# Patient Record
Sex: FEMALE | Race: WHITE | NOT HISPANIC OR LATINO | Employment: FULL TIME | URBAN - NONMETROPOLITAN AREA
[De-identification: names, ages, dates, MRNs, and addresses within clinical notes are randomized per-mention and may not be internally consistent; named-entity substitution may affect disease eponyms.]

---

## 2022-10-12 ENCOUNTER — OFFICE VISIT (OUTPATIENT)
Dept: SURGERY | Facility: CLINIC | Age: 62
End: 2022-10-12

## 2022-10-12 VITALS
WEIGHT: 112 LBS | DIASTOLIC BLOOD PRESSURE: 74 MMHG | SYSTOLIC BLOOD PRESSURE: 162 MMHG | BODY MASS INDEX: 21.99 KG/M2 | HEIGHT: 60 IN | HEART RATE: 77 BPM

## 2022-10-12 DIAGNOSIS — Z12.31 SCREENING MAMMOGRAM FOR HIGH-RISK PATIENT: ICD-10-CM

## 2022-10-12 DIAGNOSIS — R92.2 DENSE BREAST TISSUE ON MAMMOGRAM: ICD-10-CM

## 2022-10-12 DIAGNOSIS — N63.21 MASS OF UPPER OUTER QUADRANT OF LEFT BREAST: Primary | ICD-10-CM

## 2022-10-12 PROCEDURE — 99203 OFFICE O/P NEW LOW 30 MIN: CPT | Performed by: SPECIALIST

## 2022-10-12 RX ORDER — AMLODIPINE BESYLATE 5 MG/1
5 TABLET ORAL
COMMUNITY

## 2022-10-12 RX ORDER — ATENOLOL 25 MG/1
25 TABLET ORAL
COMMUNITY

## 2022-10-12 NOTE — PROGRESS NOTES
"Polina Ji MD [facility] History and Physical     Referring Provider: Anoop Matias A*      Chief complaint   Chief Complaint   Patient presents with   • Breast Mass     F/u after mammogram and U/S.        Subjective .     History of present illness:  Nydia Mcgrath is a 62 y.o. female who presents after recent bilateral screening mammogram demonstrated a 7mm rounded partially circumscribed mass at 3-4 o'clock.  Left ultrasound demonstrated a 1cm slightly lobulated solid nodule at 3 o'clock with a central echogenic portion which was thought to be an intramammary lymph node  She denies any breast, nipple, or skin changes.    History    Past Medical History:   Diagnosis Date   • Hypertension    ,   Past Surgical History:   Procedure Laterality Date   • HYSTERECTOMY     , History reviewed. No pertinent family history.,   Social History     Tobacco Use   • Smoking status: Never   • Smokeless tobacco: Never   Vaping Use   • Vaping Use: Never used   Substance Use Topics   • Alcohol use: Never   • Drug use: Never   , (Not in a hospital admission)   and Allergies:  Codeine, Food color red, Red dye, and Fexofenadine    Current Outpatient Medications:   •  amLODIPine (NORVASC) 5 MG tablet, 1 tablet., Disp: , Rfl:   •  atenolol (TENORMIN) 25 MG tablet, 1 tablet., Disp: , Rfl:     GYN History:  Age of first menses    12    Age of first live birth    16    Age of menopause    38 hysterectomy    Hormone replacement therapy  no    Family history breast cancer   no    Family history ovarian cancer   no    History breast biopsy    no        Review of Systems:    All organ systems were evaluated and found negative except those which are mentioned in the History of Present Illness.      Objective     Physical Exam:    Vital Signs   /74 (BP Location: Right arm, Patient Position: Sitting, Cuff Size: Adult)   Pulse 77   Ht 152.4 cm (60\")   Wt 50.8 kg (112 lb)   BMI 21.87 kg/m²        Constitutional:    Well-developed, " well-nourished in no acute distress  Eyes:     Extraocular movements intact; pupils equal, round, and reactive  Ears, Nose, Mouth, Throat:  Hearing intact, nose midline, no mucosal lesions  Cardiovascular:   Regular rate and rhythm   Respiratory:    Clear to auscultation bilaterally  Gastrointestinal:   Soft, nontender, nondistended, no masses, bowel sounds intact  Genitourinary:    Deferred  Musculoskeletal:   Full range of motion, no muscle wasting, no weakness  Skin:     No rashes or excoriations  Neurological:    Moves all extremities, sensation intact  Psychiatric:    Alert and oriented to person, place, and time  Hematologic/Lymphatic/Immune: No lymphadenopathy  Breast     Nipples everted without expressible discharge.  No erythema, edema, induration, or dimpling.   No palpable mass.  No axillary adenopathy.      Imaging:   [facility] screening mammogram, L us 09/01/22:  1cm solid lobulated nodule 3 o'clock probably intramammary lymph node.    BMI is within normal parameters. No other follow-up for BMI required.    Assessment & Plan       Diagnoses and all orders for this visit:    1. Mass of upper outer quadrant of left breast (Primary)  -     Mammo Diagnostic Left With CAD; Future  -     US Breast Left Complete; Future    2. Screening mammogram for high-risk patient  -     Mammo Diagnostic Left With CAD; Future  -     US Breast Left Complete; Future    3. Dense breast tissue on mammogram  -     Mammo Diagnostic Left With CAD; Future  -     US Breast Left Complete; Future           A comprehensive discussion of the breast including her clinical findings and imaging was undertaken.  She will be scheduled for left diagnostic mammogram and ultrasound in six months followed by clinical examination.  She will return sooner with breast, nipple, or skin changes.      An extensive review of patient intake forms, referring physician documents, laboratories, and imaging was performed in the medical decision making and  surgical planning of this patient.          Polina Ji MD

## 2023-02-20 ENCOUNTER — APPOINTMENT (OUTPATIENT)
Dept: OTHER | Facility: HOSPITAL | Age: 63
End: 2023-02-20
Payer: COMMERCIAL

## 2023-02-20 ENCOUNTER — APPOINTMENT (OUTPATIENT)
Dept: ULTRASOUND IMAGING | Facility: HOSPITAL | Age: 63
End: 2023-02-20

## 2023-02-20 ENCOUNTER — APPOINTMENT (OUTPATIENT)
Dept: MAMMOGRAPHY | Facility: HOSPITAL | Age: 63
End: 2023-02-20

## 2023-02-20 ENCOUNTER — HOSPITAL ENCOUNTER (OUTPATIENT)
Dept: MAMMOGRAPHY | Facility: HOSPITAL | Age: 63
Discharge: HOME | End: 2023-02-20
Payer: COMMERCIAL

## 2023-02-20 ENCOUNTER — HOSPITAL ENCOUNTER (OUTPATIENT)
Dept: ULTRASOUND IMAGING | Facility: HOSPITAL | Age: 63
Discharge: HOME | End: 2023-02-20
Payer: COMMERCIAL

## 2023-02-20 DIAGNOSIS — R92.2 DENSE BREAST TISSUE ON MAMMOGRAM: ICD-10-CM

## 2023-02-20 DIAGNOSIS — Z00.6 ENCOUNTER FOR EXAMINATION FOR NORMAL COMPARISON AND CONTROL IN CLINICAL RESEARCH PROGRAM: ICD-10-CM

## 2023-02-20 DIAGNOSIS — N63.21 MASS OF UPPER OUTER QUADRANT OF LEFT BREAST: ICD-10-CM

## 2023-02-20 DIAGNOSIS — Z12.31 SCREENING MAMMOGRAM FOR HIGH-RISK PATIENT: ICD-10-CM

## 2023-02-20 PROCEDURE — G0279 TOMOSYNTHESIS, MAMMO: HCPCS

## 2023-02-20 PROCEDURE — 76642 ULTRASOUND BREAST LIMITED: CPT

## 2023-02-20 PROCEDURE — 77065 DX MAMMO INCL CAD UNI: CPT

## 2023-02-26 NOTE — PROGRESS NOTES
"Polina Ji MD [facility] Breast follow up note    Referring Provider: No ref. provider found      Chief complaint   Chief Complaint   Patient presents with   • Follow-up        Subjective .     History of present illness:  Nydia Mcgrath  is a 62 y.o. female who presents for six month breast follow up.  She has been found to have a 1cm solid lobulated nodule at 3 o'clock on the left that was probably an intramammary lymph node.  She denies any breast, nipple, or skin changes.      History    The following portions of the patient's history were reviewed and updated as appropriate: allergies, current medications, past family history, past medical history, past social history, past surgical history, and problem list.    Objective     Vital Signs   /69 (BP Location: Left arm, Patient Position: Sitting, Cuff Size: Adult)   Pulse 71   Ht 152.4 cm (60\")   Wt 50.8 kg (112 lb)   SpO2 97%   BMI 21.87 kg/m²      Physical Exam:    General The patient is well-developed, well-nourished, and in no acute distress.  Breast  Nipples everted without expressible discharge.  No erythema, edema, induration, or dimpling.  No palpable masses.  No axillary adenopathy.      Imaging:  [facility] mammogram, L us 02/20/23:  Solid-appearing smoothly marginated hypoechoic nodule at 3 o'clock on left, 1cm, unchanged, benign fibroadenoma suggested    BMI is within normal parameters. No other follow-up for BMI required.    Assessment & Plan       Diagnoses and all orders for this visit:    1. Screening mammogram for high-risk patient (Primary)  -     Mammo Diagnostic Bilateral With CAD; Future  -     US Breast Left Complete; Future    2. Dense breast tissue on mammogram  -     Mammo Diagnostic Bilateral With CAD; Future  -     US Breast Left Complete; Future    3. Mass of upper outer quadrant of left breast  -     Mammo Diagnostic Bilateral With CAD; Future  -     US Breast Left Complete; Future           An extensive review of patient " intake forms, referring physician documents, laboratories, and imaging was performed in the medical decision making and surgical planning of this patient.     The patient will be scheduled for bilateral diagnostic mammograms and left ultrasound in six months followed by clinical examination.  She will return sooner with breast, nipple, or skin changes.      Polina Ji MD  02/27/23  12:33 CST

## 2023-02-27 ENCOUNTER — OFFICE VISIT (OUTPATIENT)
Dept: SURGERY | Facility: CLINIC | Age: 63
End: 2023-02-27
Payer: COMMERCIAL

## 2023-02-27 VITALS
OXYGEN SATURATION: 97 % | HEART RATE: 71 BPM | BODY MASS INDEX: 21.99 KG/M2 | SYSTOLIC BLOOD PRESSURE: 149 MMHG | DIASTOLIC BLOOD PRESSURE: 69 MMHG | HEIGHT: 60 IN | WEIGHT: 112 LBS

## 2023-02-27 DIAGNOSIS — R92.2 DENSE BREAST TISSUE ON MAMMOGRAM: ICD-10-CM

## 2023-02-27 DIAGNOSIS — Z12.31 SCREENING MAMMOGRAM FOR HIGH-RISK PATIENT: Primary | ICD-10-CM

## 2023-02-27 DIAGNOSIS — N63.21 MASS OF UPPER OUTER QUADRANT OF LEFT BREAST: ICD-10-CM

## 2023-02-27 PROCEDURE — 99213 OFFICE O/P EST LOW 20 MIN: CPT | Performed by: SPECIALIST

## 2023-08-17 ENCOUNTER — HOSPITAL ENCOUNTER (OUTPATIENT)
Dept: MAMMOGRAPHY | Facility: HOSPITAL | Age: 63
Discharge: HOME | End: 2023-08-17
Payer: COMMERCIAL

## 2023-08-17 ENCOUNTER — HOSPITAL ENCOUNTER (OUTPATIENT)
Dept: ULTRASOUND IMAGING | Facility: HOSPITAL | Age: 63
Discharge: HOME | End: 2023-08-17
Payer: COMMERCIAL

## 2023-08-17 DIAGNOSIS — Z12.31 SCREENING MAMMOGRAM FOR HIGH-RISK PATIENT: ICD-10-CM

## 2023-08-17 DIAGNOSIS — N63.21 MASS OF UPPER OUTER QUADRANT OF LEFT BREAST: ICD-10-CM

## 2023-08-17 DIAGNOSIS — R92.2 DENSE BREAST TISSUE ON MAMMOGRAM: ICD-10-CM

## 2023-08-17 LAB — MAMMOGRAPHY, EXTERNAL: NORMAL

## 2023-08-17 PROCEDURE — G0279 TOMOSYNTHESIS, MAMMO: HCPCS

## 2023-08-17 PROCEDURE — 76642 ULTRASOUND BREAST LIMITED: CPT

## 2023-08-17 PROCEDURE — 77066 DX MAMMO INCL CAD BI: CPT

## 2023-08-28 ENCOUNTER — OFFICE VISIT (OUTPATIENT)
Dept: FAMILY MEDICINE CLINIC | Facility: CLINIC | Age: 63
End: 2023-08-28
Payer: COMMERCIAL

## 2023-08-28 VITALS
HEART RATE: 72 BPM | DIASTOLIC BLOOD PRESSURE: 81 MMHG | TEMPERATURE: 98.6 F | OXYGEN SATURATION: 97 % | SYSTOLIC BLOOD PRESSURE: 136 MMHG | HEIGHT: 61 IN | BODY MASS INDEX: 20.65 KG/M2 | RESPIRATION RATE: 18 BRPM | WEIGHT: 109.4 LBS

## 2023-08-28 DIAGNOSIS — Z78.0 POSTMENOPAUSAL: ICD-10-CM

## 2023-08-28 DIAGNOSIS — I10 PRIMARY HYPERTENSION: Chronic | ICD-10-CM

## 2023-08-28 DIAGNOSIS — Z12.11 SCREENING FOR MALIGNANT NEOPLASM OF COLON: ICD-10-CM

## 2023-08-28 DIAGNOSIS — Z00.00 ENCOUNTER FOR ANNUAL PHYSICAL EXAM: Primary | ICD-10-CM

## 2023-08-28 DIAGNOSIS — Z28.21 VACCINATION REFUSED BY PATIENT: ICD-10-CM

## 2023-08-28 RX ORDER — AMLODIPINE BESYLATE 5 MG/1
2.5 TABLET ORAL 2 TIMES DAILY
Qty: 90 TABLET | Refills: 3 | Status: SHIPPED | OUTPATIENT
Start: 2023-08-28 | End: 2024-08-22

## 2023-08-28 RX ORDER — RANITIDINE 150 MG/1
TABLET ORAL
COMMUNITY
End: 2023-08-28

## 2023-08-28 RX ORDER — ATENOLOL 25 MG/1
12.5 TABLET ORAL 2 TIMES DAILY
Qty: 90 TABLET | Refills: 3 | Status: SHIPPED | OUTPATIENT
Start: 2023-08-28 | End: 2024-08-22

## 2023-08-28 NOTE — PROGRESS NOTES
Subjective     Chief Complaint   Patient presents with    Establish Care       History of Present Illness    Patient presents today to establish care.  Checks blood pressure several times a week at home and it is usually 126/70-80.  She has acute complaints at this time.    Patient's PMR from outside medical facility reviewed and noted.    Review of Systems   Respiratory:  Negative for shortness of breath.    Cardiovascular:  Negative for chest pain, palpitations and leg swelling.      Otherwise complete ROS reviewed and negative except as mentioned in the HPI.    Past Medical History:   Past Medical History:   Diagnosis Date    Hypertension      Past Surgical History:  Past Surgical History:   Procedure Laterality Date    HYSTERECTOMY       Social History:  reports that she has never smoked. She has never used smokeless tobacco. She reports that she does not drink alcohol and does not use drugs.    Family History: family history is not on file.      Allergies:  Allergies   Allergen Reactions    Codeine Hives    Food Color Red Itching    Red Dye Itching    Fexofenadine Palpitations     Medications:  Prior to Admission medications    Medication Sig Start Date End Date Taking? Authorizing Provider   amLODIPine (NORVASC) 5 MG tablet 0.5 tablets 2 (Two) Times a Day.   Yes Maribell Banda MD   atenolol (TENORMIN) 25 MG tablet 0.5 tablets 2 (Two) Times a Day.   Yes Maribell Banda MD   raNITIdine (ZANTAC) 150 MG tablet     ProviderMaribell MD       PHQ-9 Depression Screening  Little interest or pleasure in doing things? 0-->not at all   Feeling down, depressed, or hopeless? 0-->not at all   Trouble falling or staying asleep, or sleeping too much?     Feeling tired or having little energy?     Poor appetite or overeating?     Feeling bad about yourself - or that you are a failure or have let yourself or your family down?     Trouble concentrating on things, such as reading the newspaper or watching  "television?     Moving or speaking so slowly that other people could have noticed? Or the opposite - being so fidgety or restless that you have been moving around a lot more than usual?     Thoughts that you would be better off dead, or of hurting yourself in some way?     PHQ-9 Total Score 0   If you checked off any problems, how difficult have these problems made it for you to do your work, take care of things at home, or get along with other people?         PHQ-9 Total Score: 0   0 (Negative screening for depression)  Support given, observe for worsening symptoms    Objective     Vital Signs: /81 (BP Location: Right arm, Patient Position: Sitting, Cuff Size: Adult)   Pulse 72   Temp 98.6 øF (37 øC) (Infrared)   Resp 18   Ht 154.9 cm (61\")   Wt 49.6 kg (109 lb 6.4 oz)   SpO2 97%   BMI 20.67 kg/mý   Physical Exam  Vitals and nursing note reviewed.   Constitutional:       Appearance: Normal appearance.   HENT:      Head: Normocephalic.      Nose: Nose normal.      Mouth/Throat:      Mouth: Mucous membranes are moist.   Eyes:      Conjunctiva/sclera: Conjunctivae normal.   Cardiovascular:      Rate and Rhythm: Normal rate and regular rhythm.      Pulses: Normal pulses.      Heart sounds: Normal heart sounds.   Pulmonary:      Effort: Pulmonary effort is normal.      Breath sounds: Normal breath sounds.   Abdominal:      General: Bowel sounds are normal.      Palpations: Abdomen is soft.   Musculoskeletal:         General: Normal range of motion.      Cervical back: Normal range of motion.   Skin:     General: Skin is warm and dry.   Neurological:      General: No focal deficit present.      Mental Status: She is alert and oriented to person, place, and time.   Psychiatric:         Mood and Affect: Mood normal.         Behavior: Behavior normal.       BMI is within normal parameters. No other follow-up for BMI required.        Advance Care Planning   ACP discussion was held with the patient during this " visit. Patient does not have an advance directive, declines further assistance.         Results Reviewed:  No results found for: GLUCOSE, BUN, CREATININE, NA, K, CL, CO2, CALCIUM, ALT, AST, WBC, HCT, PLT, CHOL, TRIG, HDL, LDL, LDLHDL, HGBA1C      Assessment / Plan     Assessment/Plan     Diagnoses and all orders for this visit:    1. Encounter for annual physical exam (Primary)    2. Primary hypertension  -     amLODIPine (NORVASC) 5 MG tablet; Take 0.5 tablets by mouth 2 (Two) Times a Day for 360 days.  Dispense: 90 tablet; Refill: 3  -     atenolol (TENORMIN) 25 MG tablet; Take 0.5 tablets by mouth 2 (Two) Times a Day for 360 days.  Dispense: 90 tablet; Refill: 3    3. Postmenopausal  -     DEXA Bone Density Axial; Future    4. Screening for malignant neoplasm of colon  -     Cologuard - Stool, Per Rectum; Future    5. Vaccination refused by patient      Health Maintenance Counseling:  --Nutrition: Stressed importance of moderation in sodium/caffeine intake, saturated fat and cholesterol, caloric balance, sufficient intake of fresh fruits, vegetables, fiber, calcium and vit D  --Exercise: Recommended 30 minutes of exercise daily.  --Immunizations discussed and encouraged.  Patient declined.   --Discussed benefits of screening colonoscopy/cologuard.           An After Visit Summary was printed and given to the patient at discharge.  Return in about 1 year (around 8/28/2024) for Annual physical.    I have discussed the patient results/orders and plan/recommendation with them at today's visit.      Krystyna Obrien, ABEL   08/28/2023

## 2023-08-29 PROBLEM — Z78.0 POSTMENOPAUSAL: Status: ACTIVE | Noted: 2023-08-29

## 2023-08-29 PROBLEM — I10 PRIMARY HYPERTENSION: Chronic | Status: ACTIVE | Noted: 2023-08-29

## 2023-09-01 ENCOUNTER — PATIENT ROUNDING (BHMG ONLY) (OUTPATIENT)
Dept: FAMILY MEDICINE CLINIC | Facility: CLINIC | Age: 63
End: 2023-09-01
Payer: COMMERCIAL

## 2023-09-01 ENCOUNTER — TELEPHONE (OUTPATIENT)
Dept: FAMILY MEDICINE CLINIC | Facility: CLINIC | Age: 63
End: 2023-09-01

## 2023-09-01 NOTE — TELEPHONE ENCOUNTER
Caller: Nydia Mcgrath    Relationship: Self    Best call back number: [phone]        Who are you requesting to speak with (clinical staff, provider,  specific staff member): CLINICAL STAFF    Do you know the name of the person who called: PATIENT    What was the call regarding: GOING FORWARD, PATIENT WOULD LIKE AMLODIPINE TABLETS IN 2.5 MG TABLETS AS THE 5 MG TABLETS ARE DIFFICULT TO CUT

## 2023-09-01 NOTE — PROGRESS NOTES
September 1, 2023    Hello, may I speak with Nydia Mcgrath?    My name is Sonali      I am  with [facility]  [address] [facility] [address] [phone].    Before we get started may I verify your date of birth? 1960    I am calling to officially welcome you to our practice and ask about your recent visit. Is this a good time to talk? no

## 2023-09-11 DIAGNOSIS — Z78.0 POSTMENOPAUSAL: ICD-10-CM

## 2023-09-19 ENCOUNTER — TELEPHONE (OUTPATIENT)
Dept: SURGERY | Facility: CLINIC | Age: 63
End: 2023-09-19
Payer: COMMERCIAL

## 2023-09-19 NOTE — TELEPHONE ENCOUNTER
Called patient to inform her of appointment on 09/18/2023  with mio has been canceled because mio is no longer here. Patient voice understanding and states she will call back when ready to schedule a follow up appointment

## 2023-10-26 ENCOUNTER — TELEPHONE (OUTPATIENT)
Dept: FAMILY MEDICINE CLINIC | Facility: CLINIC | Age: 63
End: 2023-10-26
Payer: COMMERCIAL

## 2023-10-26 DIAGNOSIS — Z13.820 SCREENING FOR OSTEOPOROSIS: Primary | ICD-10-CM

## 2023-11-02 NOTE — TELEPHONE ENCOUNTER
Done  
I called her insurance today and sat on hold for over 15 minutes with no answer from anybody, I will have to try to call them again 
PT STATED INSURANCE CALLED [facility] AND [facility] CALL PATIENT TO STATE THAT HER DEXA SCAN NEEDED TO BE RECODED SO INSURANCE WOULD COVER IT.     PLEASE ADVISE  
Patients insurance called back, they state that we need to send a new order for the DEXA scan over to [facility] and it has to be coded as preventitive not diagnostic. [facility] will then send a corrective claim to patients insurance.   
See above
Spoke with patients insurance and they were unable to give me any answers without the denial in front of me. They said from what they could tell with your info and patients info it shouldve been covered 80/20 with no copay as long as it was routine. They asked that I have patient call them back. Patient notified and she is going to call her insurance. 
None

## 2024-02-13 ENCOUNTER — OFFICE VISIT (OUTPATIENT)
Dept: FAMILY MEDICINE CLINIC | Age: 64
End: 2024-02-13
Payer: MEDICARE

## 2024-02-13 VITALS
OXYGEN SATURATION: 98 % | BODY MASS INDEX: 21.2 KG/M2 | HEIGHT: 60 IN | WEIGHT: 108 LBS | SYSTOLIC BLOOD PRESSURE: 102 MMHG | DIASTOLIC BLOOD PRESSURE: 62 MMHG | HEART RATE: 83 BPM | TEMPERATURE: 98.2 F | RESPIRATION RATE: 20 BRPM

## 2024-02-13 DIAGNOSIS — R53.81 MALAISE AND FATIGUE: ICD-10-CM

## 2024-02-13 DIAGNOSIS — R53.83 MALAISE AND FATIGUE: ICD-10-CM

## 2024-02-13 DIAGNOSIS — J06.9 ACUTE URI: Primary | ICD-10-CM

## 2024-02-13 LAB
INFLUENZA A ANTIBODY: NEGATIVE
INFLUENZA B ANTIBODY: NEGATIVE
S PYO AG THROAT QL: NORMAL
SARS-COV-2 N GENE RESP QL NAA+PROBE: DETECTED

## 2024-02-13 PROCEDURE — 1036F TOBACCO NON-USER: CPT | Performed by: NURSE PRACTITIONER

## 2024-02-13 PROCEDURE — G8484 FLU IMMUNIZE NO ADMIN: HCPCS | Performed by: NURSE PRACTITIONER

## 2024-02-13 PROCEDURE — G8420 CALC BMI NORM PARAMETERS: HCPCS | Performed by: NURSE PRACTITIONER

## 2024-02-13 PROCEDURE — G8400 PT W/DXA NO RESULTS DOC: HCPCS | Performed by: NURSE PRACTITIONER

## 2024-02-13 PROCEDURE — 3017F COLORECTAL CA SCREEN DOC REV: CPT | Performed by: NURSE PRACTITIONER

## 2024-02-13 PROCEDURE — 1090F PRES/ABSN URINE INCON ASSESS: CPT | Performed by: NURSE PRACTITIONER

## 2024-02-13 PROCEDURE — G8427 DOCREV CUR MEDS BY ELIG CLIN: HCPCS | Performed by: NURSE PRACTITIONER

## 2024-02-13 PROCEDURE — 1123F ACP DISCUSS/DSCN MKR DOCD: CPT | Performed by: NURSE PRACTITIONER

## 2024-02-13 PROCEDURE — 99213 OFFICE O/P EST LOW 20 MIN: CPT | Performed by: NURSE PRACTITIONER

## 2024-02-13 RX ORDER — DEXTROMETHORPHAN HYDROBROMIDE AND PROMETHAZINE HYDROCHLORIDE 15; 6.25 MG/5ML; MG/5ML
5 SYRUP ORAL 4 TIMES DAILY PRN
Qty: 160 ML | Refills: 0 | Status: SHIPPED | OUTPATIENT
Start: 2024-02-13 | End: 2024-02-20

## 2024-02-13 RX ORDER — ATENOLOL 25 MG/1
25 TABLET ORAL 2 TIMES DAILY
COMMUNITY

## 2024-02-13 RX ORDER — AMLODIPINE BESYLATE 2.5 MG/1
2.5 TABLET ORAL 2 TIMES DAILY
COMMUNITY

## 2024-02-13 NOTE — PROGRESS NOTES
SUBJECTIVE:    Patient ID: Judy Zafar is a63 y.o. female.  Judy Zafar is here today for Headache (Symptoms started Saturday, patient states that she is starting to feel better.), Fever, Cough, and Generalized Body Aches  .    HPI:   HPI     Onset of symptoms was 3d ago with headache then the next day throat burning began. Then she began having chills.  Cough began yesterday.  Fever last 2 days.  Tx-tylenol  She reminds me that she prefers the least amount of medicine possible.  Poct flu and strep=negative for both    Spouse and daughter also sick.      Past Medical History:   Diagnosis Date    Hypertension      Prior to Visit Medications    Medication Sig Taking? Authorizing Provider   atenolol (TENORMIN) 25 MG tablet Take 1 tablet by mouth in the morning and at bedtime Yes ProviderFelisha MD   amLODIPine (NORVASC) 2.5 MG tablet Take 1 tablet by mouth in the morning and at bedtime Yes ProviderFelisha MD   promethazine-dextromethorphan (PROMETHAZINE-DM) 6.25-15 MG/5ML syrup Take 5 mLs by mouth 4 times daily as needed for Cough Yes Georgina Torres APRN     Allergies   Allergen Reactions    Red Dye #40 [Red Dye]      Past Surgical History:   Procedure Laterality Date    APPENDECTOMY      CHOLECYSTECTOMY      HYSTERECTOMY (CERVIX STATUS UNKNOWN)       Family History   Problem Relation Age of Onset    Heart Attack Mother     Diabetes Father     Heart Attack Paternal Aunt     Heart Attack Maternal Grandmother     Diabetes Maternal Grandmother     Diabetes Paternal Grandmother      Social History     Socioeconomic History    Marital status:      Spouse name: Not on file    Number of children: Not on file    Years of education: Not on file    Highest education level: Not on file   Occupational History    Not on file   Tobacco Use    Smoking status: Never     Passive exposure: Never    Smokeless tobacco: Never   Substance and Sexual Activity    Alcohol use: Never    Drug use: Never    Sexual

## 2024-02-28 ENCOUNTER — OFFICE VISIT (OUTPATIENT)
Dept: FAMILY MEDICINE CLINIC | Facility: CLINIC | Age: 64
End: 2024-02-28
Payer: MEDICARE

## 2024-02-28 VITALS
DIASTOLIC BLOOD PRESSURE: 71 MMHG | OXYGEN SATURATION: 97 % | WEIGHT: 109.6 LBS | HEIGHT: 60 IN | HEART RATE: 76 BPM | RESPIRATION RATE: 18 BRPM | TEMPERATURE: 98 F | BODY MASS INDEX: 21.52 KG/M2 | SYSTOLIC BLOOD PRESSURE: 134 MMHG

## 2024-02-28 DIAGNOSIS — I10 PRIMARY HYPERTENSION: Primary | Chronic | ICD-10-CM

## 2024-02-28 PROBLEM — E55.9 VITAMIN D DEFICIENCY: Status: ACTIVE | Noted: 2024-02-28

## 2024-02-28 PROCEDURE — 3075F SYST BP GE 130 - 139MM HG: CPT | Performed by: NURSE PRACTITIONER

## 2024-02-28 PROCEDURE — 1160F RVW MEDS BY RX/DR IN RCRD: CPT | Performed by: NURSE PRACTITIONER

## 2024-02-28 PROCEDURE — 99213 OFFICE O/P EST LOW 20 MIN: CPT | Performed by: NURSE PRACTITIONER

## 2024-02-28 PROCEDURE — 1159F MED LIST DOCD IN RCRD: CPT | Performed by: NURSE PRACTITIONER

## 2024-02-28 PROCEDURE — 3078F DIAST BP <80 MM HG: CPT | Performed by: NURSE PRACTITIONER

## 2024-02-28 RX ORDER — AMLODIPINE BESYLATE 2.5 MG/1
2.5 TABLET ORAL DAILY
Qty: 90 TABLET | Refills: 0 | Status: SHIPPED | OUTPATIENT
Start: 2024-02-28 | End: 2024-03-04 | Stop reason: SDUPTHER

## 2024-02-28 NOTE — PROGRESS NOTES
Subjective     Chief Complaint   Patient presents with    Follow-up     HTN       History of Present Illness    Patient presents today for follow up on hypertension.  Blood pressure this morning at home was 110/60.  Checks blood pressure occasionally.  She is wanting to change amlodipine to the 2.5 mg tablets so she doesn't have to break the 5 mg tablets in half.     Patient's PMR from outside medical facility reviewed and noted.    Review of Systems     Otherwise complete ROS reviewed and negative except as mentioned in the HPI.    Past Medical History:   Past Medical History:   Diagnosis Date    Hypertension      Past Surgical History:  Past Surgical History:   Procedure Laterality Date    HYSTERECTOMY       Social History:  reports that she has never smoked. She has never used smokeless tobacco. She reports that she does not drink alcohol and does not use drugs.    Family History: family history is not on file.      Allergies:  Allergies   Allergen Reactions    Codeine Hives    Food Color Red Itching    Red Dye Itching    Fexofenadine Palpitations     Medications:  Prior to Admission medications    Medication Sig Start Date End Date Taking? Authorizing Provider   amLODIPine (NORVASC) 5 MG tablet Take 0.5 tablets by mouth 2 (Two) Times a Day for 360 days. 8/28/23 8/22/24 Yes Krystyna Obrien APRN   atenolol (TENORMIN) 25 MG tablet Take 0.5 tablets by mouth 2 (Two) Times a Day for 360 days. 8/28/23 8/22/24 Yes Krystyna Obrien APRN       PHQ-9 Depression Screening  Little interest or pleasure in doing things? 0-->not at all   Feeling down, depressed, or hopeless? 0-->not at all   Trouble falling or staying asleep, or sleeping too much?     Feeling tired or having little energy?     Poor appetite or overeating?     Feeling bad about yourself - or that you are a failure or have let yourself or your family down?     Trouble concentrating on things, such as reading the newspaper or watching  "television?     Moving or speaking so slowly that other people could have noticed? Or the opposite - being so fidgety or restless that you have been moving around a lot more than usual?     Thoughts that you would be better off dead, or of hurting yourself in some way?     PHQ-9 Total Score 0   If you checked off any problems, how difficult have these problems made it for you to do your work, take care of things at home, or get along with other people?         PHQ-9 Total Score: 0   0 (Negative screening for depression)  Support given, observe for worsening symptoms    Objective     Vital Signs: /71 (BP Location: Left arm, Patient Position: Sitting, Cuff Size: Adult)   Pulse 76   Temp 98 °F (36.7 °C) (Infrared)   Resp 18   Ht 152.4 cm (60\")   Wt 49.7 kg (109 lb 9.6 oz)   SpO2 97%   BMI 21.40 kg/m²   Physical Exam  Vitals and nursing note reviewed.   Constitutional:       Appearance: Normal appearance.   HENT:      Head: Normocephalic.      Nose: Nose normal.      Mouth/Throat:      Mouth: Mucous membranes are moist.   Eyes:      Conjunctiva/sclera: Conjunctivae normal.   Cardiovascular:      Rate and Rhythm: Normal rate and regular rhythm.      Pulses: Normal pulses.      Heart sounds: Normal heart sounds.   Pulmonary:      Effort: Pulmonary effort is normal.      Breath sounds: Normal breath sounds.   Abdominal:      General: Bowel sounds are normal.      Palpations: Abdomen is soft.   Musculoskeletal:         General: Normal range of motion.      Cervical back: Normal range of motion.   Skin:     General: Skin is warm and dry.   Neurological:      General: No focal deficit present.      Mental Status: She is alert and oriented to person, place, and time.   Psychiatric:         Mood and Affect: Mood normal.         Behavior: Behavior normal.         BMI is within normal parameters. No other follow-up for BMI required.          Results Reviewed:  No results found for: \"GLUCOSE\", \"BUN\", \"CREATININE\", " "\"NA\", \"K\", \"CL\", \"CO2\", \"CALCIUM\", \"ALT\", \"AST\", \"WBC\", \"HCT\", \"PLT\", \"CHOL\", \"TRIG\", \"HDL\", \"LDL\", \"LDLHDL\", \"HGBA1C\"      Assessment / Plan     Assessment/Plan     Diagnoses and all orders for this visit:    1. Primary hypertension (Primary)  -     Discontinue: amLODIPine (NORVASC) 2.5 MG tablet; Take 1 tablet by mouth Daily.  Dispense: 90 tablet; Refill: 0         An After Visit Summary was printed and given to the patient at discharge.  Return in about 3 months (around 5/28/2024) for labs.    I have discussed the patient results/orders and plan/recommendation with them at today's visit.      Krystyna Obrien, ABEL   02/28/2024        "

## 2024-03-04 DIAGNOSIS — I10 PRIMARY HYPERTENSION: Chronic | ICD-10-CM

## 2024-03-04 RX ORDER — AMLODIPINE BESYLATE 2.5 MG/1
2.5 TABLET ORAL 2 TIMES DAILY
Qty: 180 TABLET | Refills: 1 | Status: SHIPPED | OUTPATIENT
Start: 2024-03-04 | End: 2024-08-31

## 2024-03-04 NOTE — TELEPHONE ENCOUNTER
Attempted to call and notify pt that updated prescription was sent to pharmacy. No answer left a VM.

## 2024-03-04 NOTE — TELEPHONE ENCOUNTER
Pending Prescriptions:                       Disp   Refills    amLODIPine (NORVASC) 2.5 MG tablet         90 tab*0        Sig: Take 1 tablet by mouth Daily.

## 2024-03-04 NOTE — TELEPHONE ENCOUNTER
Caller: Nydia Mcgrath    Relationship: Self    Best call back number: [phone]     Requested Prescriptions:   Requested Prescriptions     Pending Prescriptions Disp Refills    amLODIPine (NORVASC) 2.5 MG tablet 90 tablet 0     Sig: Take 1 tablet by mouth Daily.    PATIENT STATES SHE IS TAKING TWICE DAILY.     Pharmacy where request should be sent: [id number] - [address] AT [address] - [phone] [facility] [phone]      Last office visit with prescribing clinician: 2/28/2024   Last telemedicine visit with prescribing clinician: Visit date not found   Next office visit with prescribing clinician: 8/30/2024     Additional details provided by patient:     PATIENT STATES SHE PICKED UP THIS PRESCRIPTION ON 03.03.24. HOWEVER, PATIENT STATES SHE TAKES TWO DAILY INSTEAD OF ONCE DAILY THAT WAS WRITTEN ON THE PRESCRIPTION.     Does the patient have less than a 3 day supply:  [] Yes  [x] No    Would you like a call back once the refill request has been completed: [x] Yes [] No    If the office needs to give you a call back, can they leave a voicemail: [] Yes [] No    Kristi Camarillo Rep   03/04/24 08:22 CST

## 2024-03-13 ENCOUNTER — OFFICE VISIT (OUTPATIENT)
Dept: FAMILY MEDICINE CLINIC | Facility: CLINIC | Age: 64
End: 2024-03-13
Payer: MEDICARE

## 2024-03-13 VITALS
BODY MASS INDEX: 21.4 KG/M2 | SYSTOLIC BLOOD PRESSURE: 139 MMHG | DIASTOLIC BLOOD PRESSURE: 74 MMHG | OXYGEN SATURATION: 97 % | TEMPERATURE: 98 F | RESPIRATION RATE: 18 BRPM | HEART RATE: 78 BPM | HEIGHT: 60 IN | WEIGHT: 109 LBS

## 2024-03-13 DIAGNOSIS — R09.82 POST-NASAL DRAINAGE: Primary | ICD-10-CM

## 2024-03-13 DIAGNOSIS — E55.9 VITAMIN D DEFICIENCY: ICD-10-CM

## 2024-03-13 DIAGNOSIS — J30.9 ALLERGIC RHINITIS, UNSPECIFIED SEASONALITY, UNSPECIFIED TRIGGER: ICD-10-CM

## 2024-03-13 DIAGNOSIS — H65.192 ACUTE MEE (MIDDLE EAR EFFUSION), LEFT: ICD-10-CM

## 2024-03-13 DIAGNOSIS — I10 PRIMARY HYPERTENSION: ICD-10-CM

## 2024-03-13 DIAGNOSIS — Z13.29 SCREENING FOR THYROID DISORDER: ICD-10-CM

## 2024-03-13 DIAGNOSIS — Z13.220 SCREENING FOR LIPID DISORDERS: ICD-10-CM

## 2024-03-13 PROCEDURE — 3075F SYST BP GE 130 - 139MM HG: CPT | Performed by: NURSE PRACTITIONER

## 2024-03-13 PROCEDURE — 3078F DIAST BP <80 MM HG: CPT | Performed by: NURSE PRACTITIONER

## 2024-03-13 PROCEDURE — 1159F MED LIST DOCD IN RCRD: CPT | Performed by: NURSE PRACTITIONER

## 2024-03-13 PROCEDURE — 99213 OFFICE O/P EST LOW 20 MIN: CPT | Performed by: NURSE PRACTITIONER

## 2024-03-13 PROCEDURE — 1160F RVW MEDS BY RX/DR IN RCRD: CPT | Performed by: NURSE PRACTITIONER

## 2024-03-13 RX ORDER — AZELASTINE 1 MG/ML
2 SPRAY, METERED NASAL 2 TIMES DAILY
Qty: 30 ML | Refills: 6 | Status: SHIPPED | OUTPATIENT
Start: 2024-03-13

## 2024-03-13 RX ORDER — FLUTICASONE PROPIONATE 50 MCG
2 SPRAY, SUSPENSION (ML) NASAL DAILY
Qty: 16 G | Refills: 0 | Status: SHIPPED | OUTPATIENT
Start: 2024-03-13

## 2024-03-13 NOTE — PROGRESS NOTES
Subjective     Chief Complaint   Patient presents with    Sinus Problem       Sinus Problem    Patient presents today with post nasal drainage and sore throat that comes and goes for the last month.  She has pressure in her ears.  She had COVID-19 last month.  She does not take any allergy medication because it causes her heart to race.     She is fasting and is requesting for her labs to be drawn for her upcoming physical.     Patient's PMR from outside medical facility reviewed and noted.    Review of Systems     Otherwise complete ROS reviewed and negative except as mentioned in the HPI.    Past Medical History:   Past Medical History:   Diagnosis Date    Hypertension      Past Surgical History:  Past Surgical History:   Procedure Laterality Date    HYSTERECTOMY       Social History:  reports that she has never smoked. She has never used smokeless tobacco. She reports that she does not drink alcohol and does not use drugs.    Family History: family history is not on file.      Allergies:  Allergies   Allergen Reactions    Codeine Hives    Food Color Red Itching    Red Dye Itching    Fexofenadine Palpitations     Medications:  Prior to Admission medications    Medication Sig Start Date End Date Taking? Authorizing Provider   amLODIPine (NORVASC) 2.5 MG tablet Take 1 tablet by mouth 2 (Two) Times a Day for 180 days. 3/4/24 8/31/24 Yes Krystyna Obrien APRN   atenolol (TENORMIN) 25 MG tablet Take 0.5 tablets by mouth 2 (Two) Times a Day for 360 days. 8/28/23 8/22/24 Yes Krystyna Obrien APRN       PHQ-9 Depression Screening  Little interest or pleasure in doing things? 0-->not at all   Feeling down, depressed, or hopeless? 0-->not at all   Trouble falling or staying asleep, or sleeping too much?     Feeling tired or having little energy?     Poor appetite or overeating?     Feeling bad about yourself - or that you are a failure or have let yourself or your family down?     Trouble  "concentrating on things, such as reading the newspaper or watching television?     Moving or speaking so slowly that other people could have noticed? Or the opposite - being so fidgety or restless that you have been moving around a lot more than usual?     Thoughts that you would be better off dead, or of hurting yourself in some way?     PHQ-9 Total Score 0   If you checked off any problems, how difficult have these problems made it for you to do your work, take care of things at home, or get along with other people?         PHQ-9 Total Score: 0   0 (Negative screening for depression)  Support given, observe for worsening symptoms    Objective     Vital Signs: /74 (BP Location: Left arm, Patient Position: Sitting, Cuff Size: Adult)   Pulse 78   Temp 98 °F (36.7 °C) (Infrared)   Resp 18   Ht 152.4 cm (60\")   Wt 49.4 kg (109 lb)   SpO2 97%   BMI 21.29 kg/m²   Physical Exam  Vitals and nursing note reviewed.   Constitutional:       Appearance: Normal appearance.   HENT:      Head: Normocephalic.      Left Ear: A middle ear effusion is present.      Nose: Nose normal.      Mouth/Throat:      Mouth: Mucous membranes are moist.   Eyes:      Conjunctiva/sclera: Conjunctivae normal.   Cardiovascular:      Rate and Rhythm: Normal rate and regular rhythm.      Pulses: Normal pulses.      Heart sounds: Normal heart sounds.   Pulmonary:      Effort: Pulmonary effort is normal.      Breath sounds: Normal breath sounds.   Musculoskeletal:         General: Normal range of motion.      Cervical back: Normal range of motion.   Skin:     General: Skin is warm and dry.   Neurological:      General: No focal deficit present.      Mental Status: She is alert and oriented to person, place, and time.   Psychiatric:         Mood and Affect: Mood normal.         Behavior: Behavior normal.         BMI is within normal parameters. No other follow-up for BMI required.        Results Reviewed:  No results found for: \"GLUCOSE\", " "\"BUN\", \"CREATININE\", \"NA\", \"K\", \"CL\", \"CO2\", \"CALCIUM\", \"ALT\", \"AST\", \"WBC\", \"HCT\", \"PLT\", \"CHOL\", \"TRIG\", \"HDL\", \"LDL\", \"LDLHDL\", \"HGBA1C\"      Assessment / Plan     Assessment/Plan     Diagnoses and all orders for this visit:    1. Post-nasal drainage (Primary)  -     fluticasone (FLONASE) 50 MCG/ACT nasal spray; 2 sprays into the nostril(s) as directed by provider Daily.  Dispense: 16 g; Refill: 0  -     azelastine (ASTELIN) 0.1 % nasal spray; 2 sprays into the nostril(s) as directed by provider 2 (Two) Times a Day. Use in each nostril as directed  Dispense: 30 mL; Refill: 6    2. Allergic rhinitis, unspecified seasonality, unspecified trigger  -     fluticasone (FLONASE) 50 MCG/ACT nasal spray; 2 sprays into the nostril(s) as directed by provider Daily.  Dispense: 16 g; Refill: 0  -     azelastine (ASTELIN) 0.1 % nasal spray; 2 sprays into the nostril(s) as directed by provider 2 (Two) Times a Day. Use in each nostril as directed  Dispense: 30 mL; Refill: 6    3. Acute TAMAR (middle ear effusion), left    4. Primary hypertension  -     CBC & Differential  -     Comprehensive Metabolic Panel    5. Screening for thyroid disorder  -     TSH+Free T4    6. Screening for lipid disorders  -     Lipid Panel    7. Vitamin D deficiency  -     Vitamin D,25-Hydroxy         An After Visit Summary was printed and given to the patient at discharge.  Return if symptoms worsen or fail to improve.    I have discussed the patient results/orders and plan/recommendation with them at today's visit.      Krystyna Obrien, APRN   03/13/2024        "

## 2024-03-14 LAB
25(OH)D3+25(OH)D2 SERPL-MCNC: 24 NG/ML (ref 30–100)
ALBUMIN SERPL-MCNC: 4.4 G/DL (ref 3.9–4.9)
ALBUMIN/GLOB SERPL: 1.6 {RATIO} (ref 1.2–2.2)
ALP SERPL-CCNC: 88 IU/L (ref 44–121)
ALT SERPL-CCNC: 14 IU/L (ref 0–32)
AST SERPL-CCNC: 15 IU/L (ref 0–40)
BASOPHILS # BLD AUTO: 0.1 X10E3/UL (ref 0–0.2)
BASOPHILS NFR BLD AUTO: 1 %
BILIRUB SERPL-MCNC: 0.5 MG/DL (ref 0–1.2)
BUN SERPL-MCNC: 14 MG/DL (ref 8–27)
BUN/CREAT SERPL: 24 (ref 12–28)
CALCIUM SERPL-MCNC: 9.1 MG/DL (ref 8.7–10.3)
CHLORIDE SERPL-SCNC: 104 MMOL/L (ref 96–106)
CHOLEST SERPL-MCNC: 175 MG/DL (ref 100–199)
CO2 SERPL-SCNC: 21 MMOL/L (ref 20–29)
CREAT SERPL-MCNC: 0.58 MG/DL (ref 0.57–1)
EGFRCR SERPLBLD CKD-EPI 2021: 100 ML/MIN/1.73
EOSINOPHIL # BLD AUTO: 0.2 X10E3/UL (ref 0–0.4)
EOSINOPHIL NFR BLD AUTO: 2 %
ERYTHROCYTE [DISTWIDTH] IN BLOOD BY AUTOMATED COUNT: 12.1 % (ref 11.7–15.4)
GLOBULIN SER CALC-MCNC: 2.8 G/DL (ref 1.5–4.5)
GLUCOSE SERPL-MCNC: 86 MG/DL (ref 70–99)
HCT VFR BLD AUTO: 40 % (ref 34–46.6)
HDLC SERPL-MCNC: 63 MG/DL
HGB BLD-MCNC: 13.6 G/DL (ref 11.1–15.9)
IMM GRANULOCYTES # BLD AUTO: 0 X10E3/UL (ref 0–0.1)
IMM GRANULOCYTES NFR BLD AUTO: 0 %
LDLC SERPL CALC-MCNC: 102 MG/DL (ref 0–99)
LYMPHOCYTES # BLD AUTO: 1.6 X10E3/UL (ref 0.7–3.1)
LYMPHOCYTES NFR BLD AUTO: 26 %
MCH RBC QN AUTO: 30.8 PG (ref 26.6–33)
MCHC RBC AUTO-ENTMCNC: 34 G/DL (ref 31.5–35.7)
MCV RBC AUTO: 91 FL (ref 79–97)
MONOCYTES # BLD AUTO: 0.4 X10E3/UL (ref 0.1–0.9)
MONOCYTES NFR BLD AUTO: 6 %
NEUTROPHILS # BLD AUTO: 4.2 X10E3/UL (ref 1.4–7)
NEUTROPHILS NFR BLD AUTO: 65 %
PLATELET # BLD AUTO: 197 X10E3/UL (ref 150–450)
POTASSIUM SERPL-SCNC: 4 MMOL/L (ref 3.5–5.2)
PROT SERPL-MCNC: 7.2 G/DL (ref 6–8.5)
RBC # BLD AUTO: 4.41 X10E6/UL (ref 3.77–5.28)
SODIUM SERPL-SCNC: 139 MMOL/L (ref 134–144)
T4 FREE SERPL-MCNC: 1.63 NG/DL (ref 0.82–1.77)
TRIGL SERPL-MCNC: 52 MG/DL (ref 0–149)
TSH SERPL DL<=0.005 MIU/L-ACNC: 1.53 UIU/ML (ref 0.45–4.5)
VLDLC SERPL CALC-MCNC: 10 MG/DL (ref 5–40)
WBC # BLD AUTO: 6.4 X10E3/UL (ref 3.4–10.8)

## 2024-03-15 ENCOUNTER — TELEPHONE (OUTPATIENT)
Dept: FAMILY MEDICINE CLINIC | Facility: CLINIC | Age: 64
End: 2024-03-15
Payer: COMMERCIAL

## 2024-03-15 DIAGNOSIS — Z12.31 ENCOUNTER FOR SCREENING MAMMOGRAM FOR MALIGNANT NEOPLASM OF BREAST: ICD-10-CM

## 2024-03-15 DIAGNOSIS — N63.21 MASS OF UPPER OUTER QUADRANT OF LEFT BREAST: Primary | ICD-10-CM

## 2024-03-15 NOTE — TELEPHONE ENCOUNTER
Patient called in requesting order for mammo be sent in so she can go get that completed before her physical in August.

## 2024-06-10 ENCOUNTER — CLINICAL SUPPORT (OUTPATIENT)
Dept: FAMILY MEDICINE CLINIC | Facility: CLINIC | Age: 64
End: 2024-06-10
Payer: MEDICARE

## 2024-06-10 DIAGNOSIS — Z11.7 ENCOUNTER FOR TESTING FOR LATENT TUBERCULOSIS: Primary | ICD-10-CM

## 2024-06-10 PROCEDURE — 36415 COLL VENOUS BLD VENIPUNCTURE: CPT | Performed by: NURSE PRACTITIONER

## 2024-06-10 NOTE — PROGRESS NOTES
Venipuncture Blood Specimen Collection  Venipuncture performed in [facility] by Miesha Hollis LPN with good hemostasis. Patient tolerated the procedure well without complications.   06/10/24   Miesha Hollis LPN

## 2024-06-12 LAB
GAMMA INTERFERON BACKGROUND BLD IA-ACNC: 0.06 IU/ML
M TB IFN-G BLD-IMP: NEGATIVE
M TB IFN-G CD4+ BCKGRND COR BLD-ACNC: 0.05 IU/ML
M TB IFN-G CD4+CD8+ BCKGRND COR BLD-ACNC: 0.07 IU/ML
MITOGEN IGNF BCKGRD COR BLD-ACNC: >10 IU/ML
QUANTIFERON INCUBATION: NORMAL
SERVICE CMNT-IMP: NORMAL

## 2024-08-19 ENCOUNTER — HOSPITAL ENCOUNTER (OUTPATIENT)
Dept: MAMMOGRAPHY | Facility: HOSPITAL | Age: 64
Discharge: HOME | End: 2024-08-19
Payer: MEDICARE

## 2024-08-19 ENCOUNTER — HOSPITAL ENCOUNTER (OUTPATIENT)
Dept: ULTRASOUND IMAGING | Facility: HOSPITAL | Age: 64
Discharge: HOME | End: 2024-08-19
Payer: MEDICARE

## 2024-08-19 DIAGNOSIS — Z12.31 ENCOUNTER FOR SCREENING MAMMOGRAM FOR MALIGNANT NEOPLASM OF BREAST: ICD-10-CM

## 2024-08-19 DIAGNOSIS — N63.21 MASS OF UPPER OUTER QUADRANT OF LEFT BREAST: ICD-10-CM

## 2024-08-19 LAB — MAMMOGRAPHY, EXTERNAL: NORMAL

## 2024-08-19 PROCEDURE — G0279 TOMOSYNTHESIS, MAMMO: HCPCS

## 2024-08-19 PROCEDURE — 76642 ULTRASOUND BREAST LIMITED: CPT

## 2024-08-19 PROCEDURE — 77066 DX MAMMO INCL CAD BI: CPT

## 2024-09-09 ENCOUNTER — OFFICE VISIT (OUTPATIENT)
Dept: FAMILY MEDICINE CLINIC | Facility: CLINIC | Age: 64
End: 2024-09-09
Payer: MEDICARE

## 2024-09-09 VITALS
SYSTOLIC BLOOD PRESSURE: 134 MMHG | TEMPERATURE: 97.7 F | HEIGHT: 60 IN | RESPIRATION RATE: 18 BRPM | OXYGEN SATURATION: 97 % | BODY MASS INDEX: 21.01 KG/M2 | WEIGHT: 107 LBS | HEART RATE: 77 BPM | DIASTOLIC BLOOD PRESSURE: 72 MMHG

## 2024-09-09 DIAGNOSIS — I10 PRIMARY HYPERTENSION: Primary | Chronic | ICD-10-CM

## 2024-09-09 PROCEDURE — 1160F RVW MEDS BY RX/DR IN RCRD: CPT | Performed by: NURSE PRACTITIONER

## 2024-09-09 PROCEDURE — 1159F MED LIST DOCD IN RCRD: CPT | Performed by: NURSE PRACTITIONER

## 2024-09-09 PROCEDURE — 3075F SYST BP GE 130 - 139MM HG: CPT | Performed by: NURSE PRACTITIONER

## 2024-09-09 PROCEDURE — 3078F DIAST BP <80 MM HG: CPT | Performed by: NURSE PRACTITIONER

## 2024-09-09 PROCEDURE — G0439 PPPS, SUBSEQ VISIT: HCPCS | Performed by: NURSE PRACTITIONER

## 2024-09-09 RX ORDER — AMLODIPINE BESYLATE 2.5 MG/1
2.5 TABLET ORAL EVERY 12 HOURS SCHEDULED
Qty: 180 TABLET | Refills: 1 | Status: SHIPPED | OUTPATIENT
Start: 2024-09-09 | End: 2025-03-08

## 2024-09-09 RX ORDER — ATENOLOL 25 MG/1
0.5 TABLET ORAL EVERY 12 HOURS SCHEDULED
COMMUNITY
Start: 2024-08-27 | End: 2024-09-09 | Stop reason: SDUPTHER

## 2024-09-09 RX ORDER — AMLODIPINE BESYLATE 2.5 MG/1
1 TABLET ORAL EVERY 12 HOURS SCHEDULED
COMMUNITY
Start: 2024-06-25 | End: 2024-09-09 | Stop reason: SDUPTHER

## 2024-09-09 RX ORDER — ATENOLOL 25 MG/1
12.5 TABLET ORAL EVERY 12 HOURS SCHEDULED
Qty: 90 TABLET | Refills: 1 | Status: SHIPPED | OUTPATIENT
Start: 2024-09-09 | End: 2025-03-08

## 2024-10-15 ENCOUNTER — OFFICE VISIT (OUTPATIENT)
Dept: FAMILY MEDICINE CLINIC | Facility: CLINIC | Age: 64
End: 2024-10-15
Payer: MEDICARE

## 2024-10-15 VITALS
BODY MASS INDEX: 21.13 KG/M2 | DIASTOLIC BLOOD PRESSURE: 80 MMHG | HEART RATE: 62 BPM | SYSTOLIC BLOOD PRESSURE: 149 MMHG | OXYGEN SATURATION: 99 % | HEIGHT: 60 IN | TEMPERATURE: 95.5 F | WEIGHT: 107.6 LBS

## 2024-10-15 DIAGNOSIS — L30.9 DERMATITIS: Primary | ICD-10-CM

## 2024-10-15 DIAGNOSIS — I10 PRIMARY HYPERTENSION: Chronic | ICD-10-CM

## 2024-10-15 PROCEDURE — 1160F RVW MEDS BY RX/DR IN RCRD: CPT | Performed by: INTERNAL MEDICINE

## 2024-10-15 PROCEDURE — 1159F MED LIST DOCD IN RCRD: CPT | Performed by: INTERNAL MEDICINE

## 2024-10-15 PROCEDURE — 3079F DIAST BP 80-89 MM HG: CPT | Performed by: INTERNAL MEDICINE

## 2024-10-15 PROCEDURE — 3077F SYST BP >= 140 MM HG: CPT | Performed by: INTERNAL MEDICINE

## 2024-10-15 PROCEDURE — 99213 OFFICE O/P EST LOW 20 MIN: CPT | Performed by: INTERNAL MEDICINE

## 2024-10-15 RX ORDER — TRIAMCINOLONE ACETONIDE 1 MG/G
1 OINTMENT TOPICAL 2 TIMES DAILY
Qty: 30 G | Refills: 0 | Status: SHIPPED | OUTPATIENT
Start: 2024-10-15 | End: 2024-10-30

## 2024-10-15 NOTE — PROGRESS NOTES
Subjective     Chief Complaint   Patient presents with    Insect Bite     Left leg: red spot, started itching, scabs over. Noticed since last week        Insect Bite      Katina is a 64-year-old lady with hypertension which is elevated a bit today comes in with a rash on her distal left lower extremity in the medial portion just above her ankle is erythematous somewhat raised it looks like there has been a maybe there was a an initial sore in the middle of the area of concern.  She tried an antibiotic ointment on it which did not help and then she has been using cortisone cream for the last several days and has seemed to go down a little bit she said.  Has been very pruritic but no other associated symptoms    Past Medical History:   Past Medical History:   Diagnosis Date    Hypertension      Past Surgical History:  Past Surgical History:   Procedure Laterality Date    HYSTERECTOMY       Social History:  reports that she has never smoked. She has never been exposed to tobacco smoke. She has never used smokeless tobacco. She reports that she does not drink alcohol and does not use drugs.    Family History: family history is not on file.      Allergies:  Allergies   Allergen Reactions    Codeine Hives    Food Color Red Itching    Red Dye #40 (Allura Red) Itching    Fexofenadine Palpitations     Medications:  Prior to Admission medications    Medication Sig Start Date End Date Taking? Authorizing Provider   amLODIPine (NORVASC) 2.5 MG tablet Take 1 tablet by mouth Every 12 (Twelve) Hours for 180 days. 9/9/24 3/8/25 Yes Krystyna Obrien APRN   atenolol (TENORMIN) 25 MG tablet Take 0.5 tablets by mouth Every 12 (Twelve) Hours for 180 days. 9/9/24 3/8/25 Yes Krystyna Obrien APRN   triamcinolone (KENALOG) 0.1 % ointment Apply 1 Application topically to the appropriate area as directed 2 (Two) Times a Day for 15 days. 10/15/24 10/30/24  Ronny Hollis MD   azelastine (ASTELIN) 0.1 % nasal  "spray 2 sprays into the nostril(s) as directed by provider 2 (Two) Times a Day. Use in each nostril as directed  Patient not taking: Reported on 10/15/2024 3/13/24 10/15/24  Krystyna Obrien APRN   fluticasone (FLONASE) 50 MCG/ACT nasal spray 2 sprays into the nostril(s) as directed by provider Daily.  Patient not taking: Reported on 10/15/2024 3/13/24 10/15/24  Krystyna Obrien APRN           Review of systems   negative unless otherwise specified above in HPI    Objective     Vital Signs: /80 (BP Location: Left arm, Patient Position: Sitting, Cuff Size: Adult)   Pulse 62   Temp 95.5 °F (35.3 °C) (Temporal)   Ht 152.4 cm (60\")   Wt 48.8 kg (107 lb 9.6 oz)   SpO2 99%   BMI 21.01 kg/m²     Physical Exam  Constitutional:       Appearance: Normal appearance.   Skin:     General: Skin is warm and dry.             Comments: Rashes in the area denoted by the red marks above and somewhat oval distribution more contiguously red in the middle and then some satellite lesions going out from there   Neurological:      Mental Status: She is alert.         BMI is within normal parameters. No other follow-up for BMI required.                                                     Assessment / Plan     Assessment/Plan:   Diagnosis Plan   1. Dermatitis  triamcinolone (KENALOG) 0.1 % ointment      2. Primary hypertension        Says the appearance of a stasis dermatitis we will treat with triamcinolone ointment small amount applied to the rash twice daily for the next 7 to 10 days I told her to come back Friday if is not getting better or sooner if he gets worse of course but they were on the beach in the [facility] 3 weeks ago she may have gotten bitten by something there not sure what the etiology is.Bp is slightly higher than it should be today, no changes in treatment                                                No follow-ups on file. unless patient needs to be seen sooner or acute issues " arise.      I have discussed the patient results/orders and and plan/recommendation with them at today's visit.      Signed by:    Dr. Ronny Hollis Date: 10/15/24    EMR Dictation/Transcription disclaimer:   Some of this note may be an electronic transcription/translation of spoken language to printed text. The electronic translation of spoken language may permit erroneous, or at times, nonsensical words or phrases to be inadvertently transcribed; Although I have reviewed the note for such errors, some may still exist.

## 2024-11-23 DIAGNOSIS — I10 PRIMARY HYPERTENSION: Chronic | ICD-10-CM

## 2024-11-25 RX ORDER — ATENOLOL 25 MG/1
12.5 TABLET ORAL DAILY
Qty: 90 TABLET | Refills: 1 | OUTPATIENT
Start: 2024-11-25

## 2024-11-25 NOTE — TELEPHONE ENCOUNTER
Rx Refill Note  Requested Prescriptions     Pending Prescriptions Disp Refills    atenolol (TENORMIN) 25 MG tablet [Pharmacy Med Name: ATENOLOL 25MG TABLETS] 90 tablet 1     Sig: TAKE 1/2 TABLET BY MOUTH TWICE DAILY      Last office visit with prescribing clinician: 9/9/2024   Last telemedicine visit with prescribing clinician: Visit date not found   Next office visit with prescribing clinician: 3/17/2025                         Would you like a call back once the refill request has been completed: [] Yes [] No    If the office needs to give you a call back, can they leave a voicemail: [] Yes [] No    Ilana Gonsalez MA  11/25/24, 07:00 CST

## 2024-11-25 NOTE — TELEPHONE ENCOUNTER
Medication(s) Requested: clonazepam  Last office visit: 11/2/21  Last refill: 1/7/22  Is the patient due for refill of this medication(s): Yes  PDMP review: Criteria met. Forwarded to Physician/NAINA for signature.        Spoke with patient and notified her of there already being a refill at the pharmacy, patient verbalized understanding

## 2025-01-02 ENCOUNTER — COMMUNITY OUTREACH (OUTPATIENT)
Dept: FAMILY MEDICINE CLINIC | Age: 65
End: 2025-01-02

## 2025-02-15 DIAGNOSIS — I10 PRIMARY HYPERTENSION: Chronic | ICD-10-CM

## 2025-02-17 RX ORDER — AMLODIPINE BESYLATE 2.5 MG/1
2.5 TABLET ORAL EVERY 12 HOURS
Qty: 180 TABLET | Refills: 1 | Status: SHIPPED | OUTPATIENT
Start: 2025-02-17

## 2025-02-17 NOTE — TELEPHONE ENCOUNTER
Rx Refill Note  Requested Prescriptions     Pending Prescriptions Disp Refills    amLODIPine (NORVASC) 2.5 MG tablet [Pharmacy Med Name: AMLODIPINE BESYLATE 2.5MG TABLETS] 180 tablet 1     Sig: TAKE 1 TABLET BY MOUTH EVERY 12 HOURS      Last office visit with prescribing clinician: 9/9/2024   Last telemedicine visit with prescribing clinician: Visit date not found   Next office visit with prescribing clinician: 3/17/2025                         Would you like a call back once the refill request has been completed: [] Yes [] No    If the office needs to give you a call back, can they leave a voicemail: [] Yes [] No    Kacy Lange MA  02/17/25, 07:41 CST

## 2025-03-05 ENCOUNTER — TELEPHONE (OUTPATIENT)
Dept: FAMILY MEDICINE CLINIC | Facility: CLINIC | Age: 65
End: 2025-03-05

## 2025-03-05 NOTE — TELEPHONE ENCOUNTER
Caller: Nydia Mcgrath    Relationship to patient: Self    Best call back number:     [phone]       Patient is needing: DO WE HAVE A FORM WE CAN FILL OUT, SO SHE DOESN'T HAVE TO GET A REPEAT TB SKIN TEST EVERY YEAR? PLEASE CALL PATIENT TO LET HER KNOW

## 2025-04-02 ENCOUNTER — OFFICE VISIT (OUTPATIENT)
Dept: FAMILY MEDICINE CLINIC | Facility: CLINIC | Age: 65
End: 2025-04-02
Payer: MEDICARE

## 2025-04-02 VITALS
TEMPERATURE: 98.2 F | HEIGHT: 60 IN | WEIGHT: 106.4 LBS | SYSTOLIC BLOOD PRESSURE: 146 MMHG | BODY MASS INDEX: 20.89 KG/M2 | DIASTOLIC BLOOD PRESSURE: 77 MMHG | HEART RATE: 69 BPM | RESPIRATION RATE: 18 BRPM

## 2025-04-02 DIAGNOSIS — N81.10 VAGINAL PROLAPSE: ICD-10-CM

## 2025-04-02 DIAGNOSIS — E78.5 HYPERLIPIDEMIA, UNSPECIFIED HYPERLIPIDEMIA TYPE: ICD-10-CM

## 2025-04-02 DIAGNOSIS — I10 PRIMARY HYPERTENSION: Primary | ICD-10-CM

## 2025-04-02 DIAGNOSIS — E55.9 VITAMIN D DEFICIENCY: ICD-10-CM

## 2025-04-02 DIAGNOSIS — F41.1 GENERALIZED ANXIETY DISORDER: ICD-10-CM

## 2025-04-02 DIAGNOSIS — Z11.59 NEED FOR HEPATITIS C SCREENING TEST: ICD-10-CM

## 2025-04-02 RX ORDER — ATENOLOL 25 MG/1
12.5 TABLET ORAL EVERY 12 HOURS
Qty: 90 TABLET | Refills: 1 | Status: SHIPPED | OUTPATIENT
Start: 2025-04-02 | End: 2025-09-29

## 2025-04-02 RX ORDER — BUSPIRONE HYDROCHLORIDE 10 MG/1
10 TABLET ORAL 3 TIMES DAILY PRN
Qty: 90 TABLET | Refills: 0 | Status: SHIPPED | OUTPATIENT
Start: 2025-04-02

## 2025-04-02 RX ORDER — ATENOLOL 25 MG/1
12.5 TABLET ORAL EVERY 12 HOURS
COMMUNITY
Start: 2025-02-16 | End: 2025-04-02 | Stop reason: SDUPTHER

## 2025-04-02 NOTE — PROGRESS NOTES
Subjective     Chief Complaint   Patient presents with    Follow-up       History of Present Illness    Patient presents today to follow up hypertension.  Blood pressure log reviewed.  Home readings have been 104-124/54-63.  She is compliant with medication use.      Patient has generalized anxiety that seems to be worsening.  She has difficulty calming herself down.  Her daughter has had health issues lately and that seems to have triggered worsening anxiety.   She used to take xanax as needed.      Needing form for TB waiver with [facility] office.      She is also wanting referral to pelvic floor PT for bladder prolapse.  She has symptoms of urinary frequency and stress incontinence.      Patient's PMR from outside medical facility reviewed and noted.    Review of Systems     Otherwise complete ROS reviewed and negative except as mentioned in the HPI.    Past Medical History:   Past Medical History:   Diagnosis Date    Hypertension      Past Surgical History:  Past Surgical History:   Procedure Laterality Date    HYSTERECTOMY       Social History:  reports that she has never smoked. She has never been exposed to tobacco smoke. She has never used smokeless tobacco. She reports that she does not drink alcohol and does not use drugs.    Family History: family history is not on file.      Allergies:  Allergies   Allergen Reactions    Codeine Hives    Food Color Red Itching    Red Dye #40 (Allura Red) Itching    Fexofenadine Palpitations     Medications:  Prior to Admission medications    Medication Sig Start Date End Date Taking? Authorizing Provider   amLODIPine (NORVASC) 2.5 MG tablet TAKE 1 TABLET BY MOUTH EVERY 12 HOURS 2/17/25  Yes Krystyna Obrine APRN   atenolol (TENORMIN) 25 MG tablet Take 0.5 tablets by mouth Every 12 (Twelve) Hours. 2/16/25  Yes Provider, MD Maribell       KALPESH:      PHQ-9 Depression Screening  Little interest or pleasure in doing things? Not at all   Feeling down, depressed, or  "hopeless? Not at all   PHQ-2 Total Score 0   Trouble falling or staying asleep, or sleeping too much?     Feeling tired or having little energy?     Poor appetite or overeating?     Feeling bad about yourself - or that you are a failure or have let yourself or your family down?     Trouble concentrating on things, such as reading the newspaper or watching television?     Moving or speaking so slowly that other people could have noticed? Or the opposite - being so fidgety or restless that you have been moving around a lot more than usual?     Thoughts that you would be better off dead, or of hurting yourself in some way?     PHQ-9 Total Score     If you checked off any problems, how difficult have these problems made it for you to do your work, take care of things at home, or get along with other people? Not difficult at all       PHQ-9 Total Score:   0  0 (Negative screening for depression)  Support given, observe for worsening symptoms    Objective     Vital Signs: /77 (BP Location: Left arm, Patient Position: Sitting, Cuff Size: Adult)   Pulse 69   Temp 98.2 °F (36.8 °C) (Infrared)   Resp 18   Ht 152.4 cm (60\")   Wt 48.3 kg (106 lb 6.4 oz)   BMI 20.78 kg/m²   Physical Exam  Vitals and nursing note reviewed.   Constitutional:       Appearance: Normal appearance.   HENT:      Head: Normocephalic.   Cardiovascular:      Rate and Rhythm: Normal rate and regular rhythm.      Pulses: Normal pulses.      Heart sounds: Normal heart sounds.   Pulmonary:      Effort: Pulmonary effort is normal.      Breath sounds: Normal breath sounds.   Musculoskeletal:         General: Normal range of motion.   Skin:     General: Skin is warm and dry.   Neurological:      General: No focal deficit present.      Mental Status: She is alert and oriented to person, place, and time.   Psychiatric:         Mood and Affect: Mood is anxious.         Behavior: Behavior normal.         BMI is within normal parameters. No other " follow-up for BMI required.        Advance Care Planning            Results Reviewed:  Glucose   Date Value Ref Range Status   04/02/2025 86 70 - 99 mg/dL Final     BUN   Date Value Ref Range Status   04/02/2025 15 8 - 27 mg/dL Final     Creatinine   Date Value Ref Range Status   04/02/2025 0.53 (L) 0.57 - 1.00 mg/dL Final     Sodium   Date Value Ref Range Status   04/02/2025 141 134 - 144 mmol/L Final     Potassium   Date Value Ref Range Status   04/02/2025 3.9 3.5 - 5.2 mmol/L Final     Chloride   Date Value Ref Range Status   04/02/2025 103 96 - 106 mmol/L Final     Total CO2   Date Value Ref Range Status   04/02/2025 24 20 - 29 mmol/L Final     Calcium   Date Value Ref Range Status   04/02/2025 9.4 8.7 - 10.3 mg/dL Final     ALT (SGPT)   Date Value Ref Range Status   04/02/2025 13 0 - 32 IU/L Final     AST (SGOT)   Date Value Ref Range Status   04/02/2025 19 0 - 40 IU/L Final     WBC   Date Value Ref Range Status   04/02/2025 6.1 3.4 - 10.8 x10E3/uL Final     Hematocrit   Date Value Ref Range Status   04/02/2025 41.7 34.0 - 46.6 % Final     Platelets   Date Value Ref Range Status   04/02/2025 211 150 - 450 x10E3/uL Final     Triglycerides   Date Value Ref Range Status   04/02/2025 52 0 - 149 mg/dL Final     HDL Cholesterol   Date Value Ref Range Status   04/02/2025 64 >39 mg/dL Final     LDL Chol Calc (NIH)   Date Value Ref Range Status   04/02/2025 105 (H) 0 - 99 mg/dL Final         Assessment / Plan     Assessment/Plan     Diagnoses and all orders for this visit:    1. Primary hypertension (Primary)  -     atenolol (TENORMIN) 25 MG tablet; Take 0.5 tablets by mouth Every 12 (Twelve) Hours for 180 days.  Dispense: 90 tablet; Refill: 1  -     CBC & Differential  -     Comprehensive Metabolic Panel    2. Generalized anxiety disorder  -     busPIRone (BUSPAR) 10 MG tablet; Take 1 tablet by mouth 3 (Three) Times a Day As Needed (anxiety).  Dispense: 90 tablet; Refill: 0    3. Vaginal prolapse  -     Ambulatory  Referral to Physical Therapy for Evaluation & Treatment    4. Vitamin D deficiency  -     Vitamin D,25-Hydroxy    5. Hyperlipidemia, unspecified hyperlipidemia type  -     Lipid Panel    6. Need for hepatitis C screening test  -     Hepatitis C antibody               An After Visit Summary was printed and given to the patient at discharge.  Return in about 23 weeks (around 9/10/2025) for Medicare Wellness.    I have discussed the patient results/orders and plan/recommendation with them at today's visit.      Krystyna Obrien, APRN   04/02/2025

## 2025-04-03 LAB
25(OH)D3+25(OH)D2 SERPL-MCNC: 20.4 NG/ML (ref 30–100)
ALBUMIN SERPL-MCNC: 4.8 G/DL (ref 3.9–4.9)
ALP SERPL-CCNC: 97 IU/L (ref 44–121)
ALT SERPL-CCNC: 13 IU/L (ref 0–32)
AST SERPL-CCNC: 19 IU/L (ref 0–40)
BASOPHILS # BLD AUTO: 0.1 X10E3/UL (ref 0–0.2)
BASOPHILS NFR BLD AUTO: 1 %
BILIRUB SERPL-MCNC: 0.5 MG/DL (ref 0–1.2)
BUN SERPL-MCNC: 15 MG/DL (ref 8–27)
BUN/CREAT SERPL: 28 (ref 12–28)
CALCIUM SERPL-MCNC: 9.4 MG/DL (ref 8.7–10.3)
CHLORIDE SERPL-SCNC: 103 MMOL/L (ref 96–106)
CHOLEST SERPL-MCNC: 179 MG/DL (ref 100–199)
CO2 SERPL-SCNC: 24 MMOL/L (ref 20–29)
CREAT SERPL-MCNC: 0.53 MG/DL (ref 0.57–1)
EGFRCR SERPLBLD CKD-EPI 2021: 102 ML/MIN/1.73
EOSINOPHIL # BLD AUTO: 0.1 X10E3/UL (ref 0–0.4)
EOSINOPHIL NFR BLD AUTO: 2 %
ERYTHROCYTE [DISTWIDTH] IN BLOOD BY AUTOMATED COUNT: 11.7 % (ref 11.7–15.4)
GLOBULIN SER CALC-MCNC: 2.4 G/DL (ref 1.5–4.5)
GLUCOSE SERPL-MCNC: 86 MG/DL (ref 70–99)
HCT VFR BLD AUTO: 41.7 % (ref 34–46.6)
HCV IGG SERPL QL IA: NON REACTIVE
HDLC SERPL-MCNC: 64 MG/DL
HGB BLD-MCNC: 13.7 G/DL (ref 11.1–15.9)
IMM GRANULOCYTES # BLD AUTO: 0 X10E3/UL (ref 0–0.1)
IMM GRANULOCYTES NFR BLD AUTO: 0 %
LDLC SERPL CALC-MCNC: 105 MG/DL (ref 0–99)
LYMPHOCYTES # BLD AUTO: 1.5 X10E3/UL (ref 0.7–3.1)
LYMPHOCYTES NFR BLD AUTO: 25 %
MCH RBC QN AUTO: 30.2 PG (ref 26.6–33)
MCHC RBC AUTO-ENTMCNC: 32.9 G/DL (ref 31.5–35.7)
MCV RBC AUTO: 92 FL (ref 79–97)
MONOCYTES # BLD AUTO: 0.3 X10E3/UL (ref 0.1–0.9)
MONOCYTES NFR BLD AUTO: 6 %
NEUTROPHILS # BLD AUTO: 4.1 X10E3/UL (ref 1.4–7)
NEUTROPHILS NFR BLD AUTO: 66 %
PLATELET # BLD AUTO: 211 X10E3/UL (ref 150–450)
POTASSIUM SERPL-SCNC: 3.9 MMOL/L (ref 3.5–5.2)
PROT SERPL-MCNC: 7.2 G/DL (ref 6–8.5)
RBC # BLD AUTO: 4.53 X10E6/UL (ref 3.77–5.28)
SODIUM SERPL-SCNC: 141 MMOL/L (ref 134–144)
TRIGL SERPL-MCNC: 52 MG/DL (ref 0–149)
VLDLC SERPL CALC-MCNC: 10 MG/DL (ref 5–40)
WBC # BLD AUTO: 6.1 X10E3/UL (ref 3.4–10.8)

## 2025-04-09 PROBLEM — F41.1 GENERALIZED ANXIETY DISORDER: Status: ACTIVE | Noted: 2025-04-09

## 2025-05-13 DIAGNOSIS — I10 PRIMARY HYPERTENSION: ICD-10-CM

## 2025-05-13 NOTE — TELEPHONE ENCOUNTER
Requested Prescriptions     Pending Prescriptions Disp Refills    atenolol (TENORMIN) 25 MG tablet [Pharmacy Med Name: ATENOLOL 25MG TABLETS] 90 tablet 1     Sig: TAKE 1/2 TABLET BY MOUTH EVERY 12 HOURS

## 2025-05-16 RX ORDER — ATENOLOL 25 MG/1
12.5 TABLET ORAL EVERY 12 HOURS
Qty: 90 TABLET | Refills: 1 | Status: SHIPPED | OUTPATIENT
Start: 2025-05-16

## 2025-05-31 DIAGNOSIS — I10 PRIMARY HYPERTENSION: Chronic | ICD-10-CM

## 2025-06-02 RX ORDER — AMLODIPINE BESYLATE 2.5 MG/1
2.5 TABLET ORAL DAILY
Qty: 90 TABLET | Refills: 0 | Status: SHIPPED | OUTPATIENT
Start: 2025-06-02

## 2025-08-07 DIAGNOSIS — I10 PRIMARY HYPERTENSION: Chronic | ICD-10-CM

## 2025-08-08 RX ORDER — AMLODIPINE BESYLATE 2.5 MG/1
2.5 TABLET ORAL DAILY
Qty: 90 TABLET | Refills: 0 | Status: SHIPPED | OUTPATIENT
Start: 2025-08-08 | End: 2025-08-11 | Stop reason: SDUPTHER

## 2025-08-11 DIAGNOSIS — I10 PRIMARY HYPERTENSION: Chronic | ICD-10-CM

## 2025-08-11 RX ORDER — AMLODIPINE BESYLATE 2.5 MG/1
2.5 TABLET ORAL EVERY 12 HOURS SCHEDULED
Qty: 180 TABLET | Refills: 0 | OUTPATIENT
Start: 2025-08-11

## 2025-08-11 RX ORDER — AMLODIPINE BESYLATE 2.5 MG/1
2.5 TABLET ORAL DAILY
Qty: 90 TABLET | Refills: 0 | Status: SHIPPED | OUTPATIENT
Start: 2025-08-11 | End: 2025-08-18

## 2025-08-18 ENCOUNTER — TELEPHONE (OUTPATIENT)
Dept: FAMILY MEDICINE CLINIC | Facility: CLINIC | Age: 65
End: 2025-08-18
Payer: COMMERCIAL

## 2025-08-18 DIAGNOSIS — I10 PRIMARY HYPERTENSION: Chronic | ICD-10-CM

## 2025-08-18 RX ORDER — AMLODIPINE BESYLATE 2.5 MG/1
2.5 TABLET ORAL 2 TIMES DAILY
Qty: 180 TABLET | Refills: 0 | Status: SHIPPED | OUTPATIENT
Start: 2025-08-18 | End: 2025-08-19 | Stop reason: SDUPTHER

## 2025-08-19 RX ORDER — AMLODIPINE BESYLATE 2.5 MG/1
2.5 TABLET ORAL 2 TIMES DAILY
Qty: 180 TABLET | Refills: 0 | Status: SHIPPED | OUTPATIENT
Start: 2025-08-19

## 2025-08-21 ENCOUNTER — HOSPITAL ENCOUNTER (OUTPATIENT)
Dept: MAMMOGRAPHY | Facility: HOSPITAL | Age: 65
Discharge: HOME | End: 2025-08-21
Admitting: NURSE PRACTITIONER
Payer: MEDICARE

## 2025-08-21 DIAGNOSIS — Z12.31 SCREENING MAMMOGRAM FOR BREAST CANCER: ICD-10-CM

## 2025-08-21 PROCEDURE — 77063 BREAST TOMOSYNTHESIS BI: CPT

## 2025-08-21 PROCEDURE — 77067 SCR MAMMO BI INCL CAD: CPT
